# Patient Record
Sex: MALE | Race: WHITE | NOT HISPANIC OR LATINO | Employment: UNEMPLOYED | URBAN - METROPOLITAN AREA
[De-identification: names, ages, dates, MRNs, and addresses within clinical notes are randomized per-mention and may not be internally consistent; named-entity substitution may affect disease eponyms.]

---

## 2017-04-12 ENCOUNTER — HOSPITAL ENCOUNTER (EMERGENCY)
Facility: HOSPITAL | Age: 57
Discharge: HOME | End: 2017-04-12
Attending: EMERGENCY MEDICINE | Admitting: EMERGENCY MEDICINE

## 2017-04-12 ENCOUNTER — APPOINTMENT (OUTPATIENT)
Dept: CT IMAGING | Facility: HOSPITAL | Age: 57
End: 2017-04-12

## 2017-04-12 VITALS
SYSTOLIC BLOOD PRESSURE: 99 MMHG | HEART RATE: 81 BPM | BODY MASS INDEX: 25.77 KG/M2 | DIASTOLIC BLOOD PRESSURE: 77 MMHG | RESPIRATION RATE: 16 BRPM | TEMPERATURE: 98 F | WEIGHT: 180 LBS | HEIGHT: 70 IN | OXYGEN SATURATION: 97 %

## 2017-04-12 DIAGNOSIS — S00.01XA SCALP ABRASION, INITIAL ENCOUNTER: ICD-10-CM

## 2017-04-12 DIAGNOSIS — R56.9 SEIZURE (HCC): Primary | ICD-10-CM

## 2017-04-12 LAB
ALBUMIN SERPL-MCNC: 4 G/DL (ref 3.5–5.2)
ALBUMIN/GLOB SERPL: 1.1 G/DL
ALP SERPL-CCNC: 52 U/L (ref 39–117)
ALT SERPL W P-5'-P-CCNC: 34 U/L (ref 1–41)
ANION GAP SERPL CALCULATED.3IONS-SCNC: 13.6 MMOL/L
AST SERPL-CCNC: 37 U/L (ref 1–40)
BASOPHILS # BLD AUTO: 0.01 10*3/MM3 (ref 0–0.2)
BASOPHILS NFR BLD AUTO: 0.1 % (ref 0–1.5)
BILIRUB SERPL-MCNC: 0.5 MG/DL (ref 0.1–1.2)
BUN BLD-MCNC: 9 MG/DL (ref 6–20)
BUN/CREAT SERPL: 8.8 (ref 7–25)
CALCIUM SPEC-SCNC: 8.9 MG/DL (ref 8.6–10.5)
CHLORIDE SERPL-SCNC: 97 MMOL/L (ref 98–107)
CO2 SERPL-SCNC: 24.4 MMOL/L (ref 22–29)
CREAT BLD-MCNC: 1.02 MG/DL (ref 0.76–1.27)
DEPRECATED RDW RBC AUTO: 45.3 FL (ref 37–54)
EOSINOPHIL # BLD AUTO: 0.1 10*3/MM3 (ref 0–0.7)
EOSINOPHIL NFR BLD AUTO: 1.3 % (ref 0.3–6.2)
ERYTHROCYTE [DISTWIDTH] IN BLOOD BY AUTOMATED COUNT: 12.8 % (ref 11.5–14.5)
ETHANOL BLD-MCNC: <10 MG/DL (ref 0–10)
ETHANOL UR QL: <0.01 %
GFR SERPL CREATININE-BSD FRML MDRD: 75 ML/MIN/1.73
GLOBULIN UR ELPH-MCNC: 3.7 GM/DL
GLUCOSE BLD-MCNC: 157 MG/DL (ref 65–99)
HCT VFR BLD AUTO: 46.4 % (ref 40.4–52.2)
HGB BLD-MCNC: 15.7 G/DL (ref 13.7–17.6)
IMM GRANULOCYTES # BLD: 0.02 10*3/MM3 (ref 0–0.03)
IMM GRANULOCYTES NFR BLD: 0.3 % (ref 0–0.5)
LYMPHOCYTES # BLD AUTO: 1.16 10*3/MM3 (ref 0.9–4.8)
LYMPHOCYTES NFR BLD AUTO: 14.9 % (ref 19.6–45.3)
MAGNESIUM SERPL-MCNC: 2.5 MG/DL (ref 1.6–2.6)
MCH RBC QN AUTO: 32.8 PG (ref 27–32.7)
MCHC RBC AUTO-ENTMCNC: 33.8 G/DL (ref 32.6–36.4)
MCV RBC AUTO: 97.1 FL (ref 79.8–96.2)
MONOCYTES # BLD AUTO: 0.27 10*3/MM3 (ref 0.2–1.2)
MONOCYTES NFR BLD AUTO: 3.5 % (ref 5–12)
NEUTROPHILS # BLD AUTO: 6.21 10*3/MM3 (ref 1.9–8.1)
NEUTROPHILS NFR BLD AUTO: 79.9 % (ref 42.7–76)
PHOSPHATE SERPL-MCNC: 1.5 MG/DL (ref 2.5–4.5)
PLATELET # BLD AUTO: 148 10*3/MM3 (ref 140–500)
PMV BLD AUTO: 10.3 FL (ref 6–12)
POTASSIUM BLD-SCNC: 3.7 MMOL/L (ref 3.5–5.2)
PROT SERPL-MCNC: 7.7 G/DL (ref 6–8.5)
RBC # BLD AUTO: 4.78 10*6/MM3 (ref 4.6–6)
SODIUM BLD-SCNC: 135 MMOL/L (ref 136–145)
WBC NRBC COR # BLD: 7.77 10*3/MM3 (ref 4.5–10.7)

## 2017-04-12 PROCEDURE — 99284 EMERGENCY DEPT VISIT MOD MDM: CPT

## 2017-04-12 PROCEDURE — 70450 CT HEAD/BRAIN W/O DYE: CPT

## 2017-04-12 PROCEDURE — 84100 ASSAY OF PHOSPHORUS: CPT | Performed by: EMERGENCY MEDICINE

## 2017-04-12 PROCEDURE — 85025 COMPLETE CBC W/AUTO DIFF WBC: CPT | Performed by: EMERGENCY MEDICINE

## 2017-04-12 PROCEDURE — 25010000002 LORAZEPAM PER 2 MG: Performed by: EMERGENCY MEDICINE

## 2017-04-12 PROCEDURE — 80053 COMPREHEN METABOLIC PANEL: CPT | Performed by: EMERGENCY MEDICINE

## 2017-04-12 PROCEDURE — 83735 ASSAY OF MAGNESIUM: CPT | Performed by: EMERGENCY MEDICINE

## 2017-04-12 PROCEDURE — 96375 TX/PRO/DX INJ NEW DRUG ADDON: CPT

## 2017-04-12 PROCEDURE — 80307 DRUG TEST PRSMV CHEM ANLYZR: CPT | Performed by: EMERGENCY MEDICINE

## 2017-04-12 PROCEDURE — 25010000003 LEVETIRACETAM IN NACL 0.75% 1000 MG/100ML SOLUTION: Performed by: EMERGENCY MEDICINE

## 2017-04-12 PROCEDURE — 96365 THER/PROPH/DIAG IV INF INIT: CPT

## 2017-04-12 RX ORDER — LEVETIRACETAM 500 MG/1
500 TABLET ORAL 2 TIMES DAILY
Qty: 60 TABLET | Refills: 0 | Status: SHIPPED | OUTPATIENT
Start: 2017-04-12

## 2017-04-12 RX ORDER — LEVETIRACETAM 10 MG/ML
1000 INJECTION INTRAVASCULAR ONCE
Status: COMPLETED | OUTPATIENT
Start: 2017-04-12 | End: 2017-04-12

## 2017-04-12 RX ORDER — FOSPHENYTOIN SODIUM 50 MG/ML
15 INJECTION, SOLUTION INTRAMUSCULAR; INTRAVENOUS ONCE
Status: DISCONTINUED | OUTPATIENT
Start: 2017-04-12 | End: 2017-04-12

## 2017-04-12 RX ORDER — LORAZEPAM 2 MG/ML
1 INJECTION INTRAMUSCULAR ONCE
Status: COMPLETED | OUTPATIENT
Start: 2017-04-12 | End: 2017-04-12

## 2017-04-12 RX ADMIN — LORAZEPAM 1 MG: 2 INJECTION INTRAMUSCULAR; INTRAVENOUS at 20:48

## 2017-04-12 RX ADMIN — LEVETIRACETAM 1000 MG: 1000 INJECTION, SOLUTION INTRAVENOUS at 21:15

## 2017-04-12 NOTE — ED PROVIDER NOTES
EMERGENCY DEPARTMENT ENCOUNTER    CHIEF COMPLAINT  Chief Complaint: seizures  History given by: patient, family  History limited by: none  Room Number: 12/12  PMD: No Known Provider      HPI:  Pt is a 56 y.o. male who presents complaining of two breakthrough seizures today. These are the first he has suffered from since 2013. He has been under some increased stress over the past several days as his mother has recently been hospitalized. At current he complains of some mild leg pain and has a head contusion, but denies any other sx at current. Pt has taken Dilantin in the past, but is not currently medicated.    Duration:  minutes  Onset: sudden  Timing: two episodes  Intensity/Severity: moderate  Progression: improved  Associated Symptoms: mild leg pain  Aggravating Factors: none stated  Alleviating Factors: none stated  Previous Episodes: hx of seizures, last in 2013  Treatment before arrival: none stated    PAST MEDICAL HISTORY  Active Ambulatory Problems     Diagnosis Date Noted   • No Active Ambulatory Problems     Resolved Ambulatory Problems     Diagnosis Date Noted   • No Resolved Ambulatory Problems     Past Medical History:   Diagnosis Date   • Seizures        PAST SURGICAL HISTORY  History reviewed. No pertinent surgical history.    FAMILY HISTORY  History reviewed. No pertinent family history.    SOCIAL HISTORY  Social History     Social History   • Marital status: Single     Spouse name: N/A   • Number of children: N/A   • Years of education: N/A     Occupational History   • Not on file.     Social History Main Topics   • Smoking status: Not on file   • Smokeless tobacco: Not on file   • Alcohol use Not on file   • Drug use: Not on file   • Sexual activity: Not on file     Other Topics Concern   • Not on file     Social History Narrative   • No narrative on file       ALLERGIES  Review of patient's allergies indicates no known allergies.    REVIEW OF SYSTEMS  Review of Systems   Constitutional: Negative  for chills and fever.   HENT: Negative for congestion and sore throat.    Eyes: Negative.    Respiratory: Negative for cough and shortness of breath.    Cardiovascular: Negative for chest pain and leg swelling.   Gastrointestinal: Negative for abdominal pain, diarrhea and vomiting.   Genitourinary: Negative for difficulty urinating and dysuria.   Musculoskeletal: Positive for myalgias (mild left leg). Negative for back pain and neck pain.   Skin: Negative for pallor and rash.   Allergic/Immunologic: Negative.    Neurological: Positive for seizures. Negative for dizziness, weakness, numbness and headaches.   Psychiatric/Behavioral: Negative.    All other systems reviewed and are negative.      PHYSICAL EXAM  ED Triage Vitals   Temp Heart Rate Resp BP SpO2   04/12/17 1953 04/12/17 1953 04/12/17 1953 04/12/17 1953 04/12/17 1953   98 °F (36.7 °C) 89 16 130/80 95 %      Temp src Heart Rate Source Patient Position BP Location FiO2 (%)   -- -- -- -- --              Physical Exam   Constitutional: He is oriented to person, place, and time and well-developed, well-nourished, and in no distress.   HENT:   Head: Normocephalic. Head is with abrasion.   No tongue abrasion   Eyes: Pupils are equal, round, and reactive to light. Right eye exhibits nystagmus (mild horizontal). Left eye exhibits nystagmus (mild horizontal).   Neck: Normal range of motion. Neck supple.   Cardiovascular: Normal rate, regular rhythm and normal heart sounds.    Pulmonary/Chest: Effort normal and breath sounds normal. No respiratory distress.   Abdominal: Soft. There is no tenderness. There is no rebound and no guarding.   Musculoskeletal: Normal range of motion. He exhibits no edema.   Neurological: He is alert and oriented to person, place, and time. He has normal sensation and normal strength.   Pt is obtunded, but answers questions appropriately   Skin: Skin is warm and dry.   Psychiatric: Mood and affect normal.   Nursing note and vitals  reviewed.      LAB RESULTS  Lab Results (last 24 hours)     Procedure Component Value Units Date/Time    CBC & Differential [[id number]] Collected:  04/12/17 2048    Specimen:  Blood Updated:  04/12/17 2106    Narrative:       The following orders were created for panel order CBC & Differential.  Procedure                               Abnormality         Status                     ---------                               -----------         ------                     CBC Auto Differential[[id number]]         Abnormal            Final result                 Please view results for these tests on the individual orders.    Comprehensive Metabolic Panel [[id number]]  (Abnormal) Collected:  04/12/17 2048    Specimen:  Blood Updated:  04/12/17 2124     Glucose 157 (H) mg/dL      BUN 9 mg/dL      Creatinine 1.02 mg/dL      Sodium 135 (L) mmol/L      Potassium 3.7 mmol/L      Chloride 97 (L) mmol/L      CO2 24.4 mmol/L      Calcium 8.9 mg/dL      Total Protein 7.7 g/dL      Albumin 4.00 g/dL      ALT (SGPT) 34 U/L      AST (SGOT) 37 U/L      Alkaline Phosphatase 52 U/L      Total Bilirubin 0.5 mg/dL      eGFR Non African Amer 75 mL/min/1.73      Globulin 3.7 gm/dL      A/G Ratio 1.1 g/dL      BUN/Creatinine Ratio 8.8     Anion Gap 13.6 mmol/L     Magnesium [[id number]]  (Normal) Collected:  04/12/17 2048    Specimen:  Blood Updated:  04/12/17 2124     Magnesium 2.5 mg/dL     Phosphorus [[id number]]  (Abnormal) Collected:  04/12/17 2048    Specimen:  Blood Updated:  04/12/17 2124     Phosphorus 1.5 (L) mg/dL     Ethanol [[id number]] Collected:  04/12/17 2048    Specimen:  Blood Updated:  04/12/17 2124     Ethanol <10 mg/dL      Ethanol % <0.010 %     CBC Auto Differential [[id number]]  (Abnormal) Collected:  04/12/17 2048    Specimen:  Blood Updated:  04/12/17 2106     WBC 7.77 10*3/mm3      RBC 4.78 10*6/mm3      Hemoglobin 15.7 g/dL      Hematocrit 46.4 %      MCV 97.1 (H) fL      MCH 32.8 (H) pg      MCHC 33.8 g/dL      RDW 12.8 %       RDW-SD 45.3 fl      MPV 10.3 fL      Platelets 148 10*3/mm3      Neutrophil % 79.9 (H) %      Lymphocyte % 14.9 (L) %      Monocyte % 3.5 (L) %      Eosinophil % 1.3 %      Basophil % 0.1 %      Immature Grans % 0.3 %      Neutrophils, Absolute 6.21 10*3/mm3      Lymphocytes, Absolute 1.16 10*3/mm3      Monocytes, Absolute 0.27 10*3/mm3      Eosinophils, Absolute 0.10 10*3/mm3      Basophils, Absolute 0.01 10*3/mm3      Immature Grans, Absolute 0.02 10*3/mm3           I ordered the above labs and reviewed the results    RADIOLOGY  CT Head Without Contrast   Preliminary Result   No evidence of fracture or hemorrhage. The etiology for the   patient's seizures is uncertain. Further evaluation could be performed   with MRI examination of the brain, particularly if the patient has a   history of new onset seizure activity.       The above information was called to and discussed with Dr. Burden.               I ordered the above noted radiological studies. Interpreted by radiologist. Reviewed by me in PACS.       PROCEDURES  Procedures      PROGRESS AND CONSULTS  ED Course     8:29 PM: Fosphenytoin, Ativan ordered for seizure activity. Head CT ordered for further analysis. Labs also ordered.     9:41 PM: Head CT is negative acute, will d/c home with instructions to f/u with neurology, Keppra prescribed in the meantime.    9:50 PM: Pt rechecked, he is disoriented to time. Will continue to monitor mental status before discharging.    11:00 PM: Nursing staff reports that pt is now alert and fully oriented to person, place, and time. Will discharge as planned.      MEDICAL DECISION MAKING  Results were reviewed/discussed with the patient and they were also made aware of online access. Pt also made aware that some labs, such as cultures, will not be resulted during ER visit and follow up with PMD is necessary.     MDM  Number of Diagnoses or Management Options     Amount and/or Complexity of Data Reviewed  Clinical lab tests:  ordered and reviewed (WBC 7.77, EtOH <10, Magnesium 2.5, phosphorus 1.5, Potassium 3.7, Sodium 135)  Tests in the radiology section of CPT®: ordered and reviewed (CT head shows no acute changes)  Independent visualization of images, tracings, or specimens: yes    Patient Progress  Patient progress: stable         DIAGNOSIS  Final diagnoses:   Seizure   Scalp abrasion, initial encounter       DISPOSITION  DISCHARGED @ 11:11 PM    Patient discharged in stable condition.    Reviewed implications of results, diagnosis, meds, responsibility to follow up, warning signs and symptoms of possible worsening, potential complications and reasons to return to ER.    Patient/Family voiced understanding of above instructions.    Discussed plan for discharge, as there is no emergent indication for admission.  Pt/family is agreeable and understands need for follow up and repeat testing.  Pt is aware that discharge does not mean that nothing is wrong but it indicates no emergency is present that requires admission and they must continue care with follow-up as given below or physician of their choice.     FOLLOW-UP  Aleks Malagon DO  [address] [place] [address]  [phone]    Schedule an appointment as soon as possible for a visit in 1 day         Medication List      New Prescriptions          levETIRAcetam 500 MG tablet   Commonly known as:  KEPPRA   Take 1 tablet by mouth 2 (Two) Times a Day.         Latest Documented Vital Signs:  As of 11:16 PM  BP- 99/77 HR- 81 Temp- 98 °F (36.7 °C) O2 sat- 97%    --  Documentation assistance provided by ermias Finley for Dr. Burden.  Information recorded by the scribe was done at my direction and has been verified and validated by me.       Wilfredo Finley  04/12/17 9979       Wilfredo Finley  04/12/17 9865       Dinesh Burden MD  04/13/17 0032

## 2017-04-12 NOTE — ED NOTES
2 WITNESSED SEIZURES TODAY WHILE AT HOME. PT STATES HIS LAST SEIZURE WAS IN 2013.      Adenike Millard RN  04/12/17 1953

## 2017-04-13 NOTE — ED NOTES
Pt wife comes to triage desk and reports they were driving out of the parking lot and pt started to vomit. Pt wife asks if she should bring pt back in. MD Burden consulted. MD reports that pt CT scan came back looking fine and that he would suggest pt go home and go to bed, but if pt starts to feel worse to return to the ER. Information relayed to pt and spouse. Pt reports that they are going to go home. Pt given warm blanket and emesis bag for departure.      Zora Felipe RN  04/12/17 [id number]

## 2017-04-13 NOTE — ED NOTES
Per patient's sister the patient had 2 grand mal seizures today. States that he was shaking all over. Reports that the first time he had a seizure he fell and hit his head. Patient has an abrasion to his right forehead. Per family patient has a seizure disorder but is not on any medications. Reports that he took himself off of his meds because he hadn't had a seizure in a long time. States that he had a seizure on Saturday and was seen at [facility] but that they did not give him any seizure medications. Patient is post ictal and has some confusion and lethargy. Side rails padded and seizure precautions initiated.      Ashley Wade RN  04/12/17 2012